# Patient Record
Sex: MALE | Race: WHITE | NOT HISPANIC OR LATINO | Employment: OTHER | URBAN - METROPOLITAN AREA
[De-identification: names, ages, dates, MRNs, and addresses within clinical notes are randomized per-mention and may not be internally consistent; named-entity substitution may affect disease eponyms.]

---

## 2017-05-26 ENCOUNTER — OFFICE VISIT (OUTPATIENT)
Dept: FAMILY MEDICINE | Facility: CLINIC | Age: 32
End: 2017-05-26
Payer: COMMERCIAL

## 2017-05-26 VITALS
HEART RATE: 84 BPM | DIASTOLIC BLOOD PRESSURE: 60 MMHG | BODY MASS INDEX: 27.62 KG/M2 | HEIGHT: 67 IN | WEIGHT: 176 LBS | SYSTOLIC BLOOD PRESSURE: 128 MMHG

## 2017-05-26 DIAGNOSIS — Z00.00 ROUTINE CHECK-UP: Primary | ICD-10-CM

## 2017-05-26 PROCEDURE — 99385 PREV VISIT NEW AGE 18-39: CPT | Mod: S$GLB,,, | Performed by: NURSE PRACTITIONER

## 2017-05-26 PROCEDURE — 99999 PR PBB SHADOW E&M-NEW PATIENT-LVL II: CPT | Mod: PBBFAC,,, | Performed by: NURSE PRACTITIONER

## 2017-05-26 NOTE — PROGRESS NOTES
Subjective:       Patient ID: Darren East is a 31 y.o. male.    Chief Complaint: Annual Exam    Here for PE for fostering      Review of Systems   Constitutional: Negative.  Negative for appetite change, fatigue and fever.   HENT: Negative.  Negative for congestion, ear pain and sore throat.    Eyes: Negative.  Negative for visual disturbance.   Respiratory: Negative.  Negative for cough, shortness of breath and wheezing.    Cardiovascular: Negative.  Negative for chest pain and palpitations.   Gastrointestinal: Negative.  Negative for abdominal pain, diarrhea, nausea and vomiting.   Genitourinary: Negative.  Negative for difficulty urinating.   Musculoskeletal: Negative.    Skin: Negative.  Negative for rash.   Neurological: Negative.  Negative for headaches.   Psychiatric/Behavioral: Negative.  Negative for sleep disturbance. The patient is not nervous/anxious.    All other systems reviewed and are negative.      Objective:      Physical Exam   Constitutional: He is oriented to person, place, and time. He appears well-developed and well-nourished.   HENT:   Head: Normocephalic.   Right Ear: External ear normal.   Left Ear: External ear normal.   Nose: Nose normal.   Mouth/Throat: Oropharynx is clear and moist.   Eyes: Conjunctivae and EOM are normal. Pupils are equal, round, and reactive to light.   Neck: Normal range of motion. Neck supple.   Cardiovascular: Normal rate, regular rhythm and normal heart sounds.    Pulmonary/Chest: Effort normal and breath sounds normal.   Abdominal: Soft. Bowel sounds are normal.   Musculoskeletal: Normal range of motion.   Neurological: He is alert and oriented to person, place, and time.   Skin: Skin is warm and dry.   Psychiatric: He has a normal mood and affect.   Nursing note and vitals reviewed.      Assessment:       1. Routine check-up        Plan:   Darren was seen today for annual exam.    Diagnoses and all orders for this visit:    Routine check-up    RTC PRN

## 2020-05-22 ENCOUNTER — TELEPHONE (OUTPATIENT)
Dept: FAMILY MEDICINE | Facility: CLINIC | Age: 35
End: 2020-05-22

## 2020-05-22 NOTE — TELEPHONE ENCOUNTER
Attempted to return call to patient's wife to schedule appointment for next week, no answer. Unable to leave message.

## 2020-05-22 NOTE — TELEPHONE ENCOUNTER
Patient has not seen you since 2017. He is considered a new patient. His wife reports they are adopting a child and need to have a physical before the process can move forward any more.    Is this okay? Please advise.

## 2020-05-22 NOTE — TELEPHONE ENCOUNTER
----- Message from Brisa Brewer sent at 2020 10:08 AM CDT -----  Contact: Wife- Afshan East  MRN: [id number]  : 1985  PCP: Oneyda Becerra  Home Phone      [phone]  Work Phone      Not on file.  Mobile          [phone]      MESSAGE:   Wife states they are adopting a child and needs a  Physical to do so, needs this ASAP please advise     Phone:  [phone]

## 2020-05-25 NOTE — TELEPHONE ENCOUNTER
Spoke with patient's wife. She states that the physical was to re-certify their foster care auth, but the  moved up the adoption to the 9th and states to them that they do not need the physical prior. She states that they are no longer needing to schedule.    Couple is adopting a little GIRL. Congratulated them on their newest addition and told them to call if they needed anything else.